# Patient Record
Sex: MALE | Race: BLACK OR AFRICAN AMERICAN | Employment: OTHER | URBAN - METROPOLITAN AREA
[De-identification: names, ages, dates, MRNs, and addresses within clinical notes are randomized per-mention and may not be internally consistent; named-entity substitution may affect disease eponyms.]

---

## 2019-01-12 ENCOUNTER — IP HISTORICAL/CONVERTED ENCOUNTER (OUTPATIENT)
Dept: OTHER | Age: 29
End: 2019-01-12

## 2021-10-20 ENCOUNTER — HOSPITAL ENCOUNTER (INPATIENT)
Age: 31
LOS: 6 days | Discharge: HOME | DRG: 898 | End: 2021-10-26
Attending: PSYCHIATRY & NEUROLOGY | Admitting: PSYCHIATRY & NEUROLOGY
Payer: OTHER GOVERNMENT

## 2021-10-20 DIAGNOSIS — F10.10 ETOH ABUSE: ICD-10-CM

## 2021-10-20 DIAGNOSIS — F22 DELUSIONAL DISORDER (HCC): Primary | ICD-10-CM

## 2021-10-20 LAB
ALBUMIN SERPL-MCNC: 4.5 G/DL (ref 3.5–5)
ALBUMIN/GLOB SERPL: 1.2 {RATIO} (ref 1.1–2.2)
ALP SERPL-CCNC: 80 U/L (ref 45–117)
ALT SERPL-CCNC: 28 U/L (ref 12–78)
AMPHET UR QL SCN: NEGATIVE
ANION GAP SERPL CALC-SCNC: 10 MMOL/L (ref 5–15)
APAP SERPL-MCNC: <10 UG/ML (ref 10–30)
APPEARANCE UR: CLEAR
AST SERPL W P-5'-P-CCNC: 15 U/L (ref 15–37)
BACTERIA URNS QL MICRO: NEGATIVE /HPF
BARBITURATES UR QL SCN: NEGATIVE
BENZODIAZ UR QL: NEGATIVE
BILIRUB SERPL-MCNC: 0.6 MG/DL (ref 0.2–1)
BILIRUB UR QL: NEGATIVE
BUN SERPL-MCNC: 7 MG/DL (ref 6–20)
BUN/CREAT SERPL: 7 (ref 12–20)
CA-I BLD-MCNC: 8.9 MG/DL (ref 8.5–10.1)
CANNABINOIDS UR QL SCN: POSITIVE
CHLORIDE SERPL-SCNC: 107 MMOL/L (ref 97–108)
CO2 SERPL-SCNC: 26 MMOL/L (ref 21–32)
COCAINE UR QL SCN: NEGATIVE
COLOR UR: NORMAL
CREAT SERPL-MCNC: 1.02 MG/DL (ref 0.7–1.3)
DATE LAST DOSE: ABNORMAL
DATE LAST DOSE: ABNORMAL
DRUG SCRN COMMENT,DRGCM: ABNORMAL
ERYTHROCYTE [DISTWIDTH] IN BLOOD BY AUTOMATED COUNT: 13.7 % (ref 11.5–14.5)
ETHANOL SERPL-MCNC: 188 MG/DL
ETHANOL SERPL-MCNC: 296 MG/DL
GLOBULIN SER CALC-MCNC: 3.7 G/DL (ref 2–4)
GLUCOSE SERPL-MCNC: 91 MG/DL (ref 65–100)
GLUCOSE UR STRIP.AUTO-MCNC: NEGATIVE MG/DL
HCT VFR BLD AUTO: 44.9 % (ref 36.6–50.3)
HGB BLD-MCNC: 15.2 G/DL (ref 12.1–17)
HGB UR QL STRIP: NEGATIVE
KETONES UR QL STRIP.AUTO: NEGATIVE MG/DL
LEUKOCYTE ESTERASE UR QL STRIP.AUTO: NEGATIVE
MCH RBC QN AUTO: 29.3 PG (ref 26–34)
MCHC RBC AUTO-ENTMCNC: 33.9 G/DL (ref 30–36.5)
MCV RBC AUTO: 86.7 FL (ref 80–99)
METHADONE UR QL: NEGATIVE
NITRITE UR QL STRIP.AUTO: NEGATIVE
NRBC # BLD: 0 K/UL (ref 0–0.01)
NRBC BLD-RTO: 0 PER 100 WBC
OPIATES UR QL: NEGATIVE
PCP UR QL: NEGATIVE
PH UR STRIP: 7 [PH] (ref 5–8)
PLATELET # BLD AUTO: 299 K/UL (ref 150–400)
PMV BLD AUTO: 10.7 FL (ref 8.9–12.9)
POTASSIUM SERPL-SCNC: 3.9 MMOL/L (ref 3.5–5.1)
PROT SERPL-MCNC: 8.2 G/DL (ref 6.4–8.2)
PROT UR STRIP-MCNC: NEGATIVE MG/DL
RBC # BLD AUTO: 5.18 M/UL (ref 4.1–5.7)
RBC #/AREA URNS HPF: NORMAL /HPF (ref 0–5)
REPORTED DOSE,DOSE: ABNORMAL UNITS
REPORTED DOSE,DOSE: ABNORMAL UNITS
SALICYLATES SERPL-MCNC: <1.7 MG/DL (ref 2.8–20)
SARS-COV-2, COV2: NOT DETECTED
SODIUM SERPL-SCNC: 143 MMOL/L (ref 136–145)
SP GR UR REFRACTOMETRY: <1.005 (ref 1–1.03)
UROBILINOGEN UR QL STRIP.AUTO: 0.1 EU/DL (ref 0.1–1)
WBC # BLD AUTO: 8.9 K/UL (ref 4.1–11.1)
WBC URNS QL MICRO: NORMAL /HPF (ref 0–4)

## 2021-10-20 PROCEDURE — 65220000003 HC RM SEMIPRIVATE PSYCH

## 2021-10-20 PROCEDURE — 80307 DRUG TEST PRSMV CHEM ANLYZR: CPT

## 2021-10-20 PROCEDURE — 87635 SARS-COV-2 COVID-19 AMP PRB: CPT

## 2021-10-20 PROCEDURE — 74011250636 HC RX REV CODE- 250/636: Performed by: NURSE PRACTITIONER

## 2021-10-20 PROCEDURE — 80143 DRUG ASSAY ACETAMINOPHEN: CPT

## 2021-10-20 PROCEDURE — 99283 EMERGENCY DEPT VISIT LOW MDM: CPT

## 2021-10-20 PROCEDURE — 36415 COLL VENOUS BLD VENIPUNCTURE: CPT

## 2021-10-20 PROCEDURE — 85027 COMPLETE CBC AUTOMATED: CPT

## 2021-10-20 PROCEDURE — 80053 COMPREHEN METABOLIC PANEL: CPT

## 2021-10-20 PROCEDURE — 82077 ASSAY SPEC XCP UR&BREATH IA: CPT

## 2021-10-20 PROCEDURE — 81001 URINALYSIS AUTO W/SCOPE: CPT

## 2021-10-20 PROCEDURE — 96372 THER/PROPH/DIAG INJ SC/IM: CPT

## 2021-10-20 PROCEDURE — 80179 DRUG ASSAY SALICYLATE: CPT

## 2021-10-20 RX ORDER — LORAZEPAM 2 MG/ML
1 INJECTION INTRAMUSCULAR
Status: DISCONTINUED | OUTPATIENT
Start: 2021-10-20 | End: 2021-10-26 | Stop reason: HOSPADM

## 2021-10-20 RX ORDER — ADHESIVE BANDAGE
30 BANDAGE TOPICAL DAILY PRN
Status: DISCONTINUED | OUTPATIENT
Start: 2021-10-20 | End: 2021-10-26 | Stop reason: HOSPADM

## 2021-10-20 RX ORDER — HALOPERIDOL 5 MG/ML
5 INJECTION INTRAMUSCULAR
Status: DISCONTINUED | OUTPATIENT
Start: 2021-10-20 | End: 2021-10-26 | Stop reason: HOSPADM

## 2021-10-20 RX ORDER — DIPHENHYDRAMINE HYDROCHLORIDE 50 MG/ML
50 INJECTION, SOLUTION INTRAMUSCULAR; INTRAVENOUS
Status: DISCONTINUED | OUTPATIENT
Start: 2021-10-20 | End: 2021-10-26 | Stop reason: HOSPADM

## 2021-10-20 RX ORDER — ZIPRASIDONE MESYLATE 20 MG/ML
20 INJECTION, POWDER, LYOPHILIZED, FOR SOLUTION INTRAMUSCULAR
Status: DISCONTINUED | OUTPATIENT
Start: 2021-10-20 | End: 2021-10-26 | Stop reason: HOSPADM

## 2021-10-20 RX ORDER — ACETAMINOPHEN 325 MG/1
650 TABLET ORAL
Status: DISCONTINUED | OUTPATIENT
Start: 2021-10-20 | End: 2021-10-26 | Stop reason: HOSPADM

## 2021-10-20 RX ORDER — ZIPRASIDONE MESYLATE 20 MG/ML
20 INJECTION, POWDER, LYOPHILIZED, FOR SOLUTION INTRAMUSCULAR
Status: COMPLETED | OUTPATIENT
Start: 2021-10-20 | End: 2021-10-20

## 2021-10-20 RX ORDER — HYDROXYZINE 50 MG/1
50 TABLET, FILM COATED ORAL
Status: DISCONTINUED | OUTPATIENT
Start: 2021-10-20 | End: 2021-10-26 | Stop reason: HOSPADM

## 2021-10-20 RX ORDER — TRAZODONE HYDROCHLORIDE 50 MG/1
50 TABLET ORAL
Status: DISCONTINUED | OUTPATIENT
Start: 2021-10-20 | End: 2021-10-26 | Stop reason: HOSPADM

## 2021-10-20 RX ADMIN — ZIPRASIDONE MESYLATE 20 MG: 20 INJECTION, POWDER, LYOPHILIZED, FOR SOLUTION INTRAMUSCULAR at 17:21

## 2021-10-20 NOTE — ED NOTES
Received care of patient, cooperative but agitated, states he does not want to be here, wants to leave. No si or hi at this time, no visual or auditory hallucination. Placed in green gown, room psych proofed, belongings placed in station three patient belongings, cco at bedside,  notified.

## 2021-10-20 NOTE — ED TRIAGE NOTES
Kellie Manual referred him here. .. Sgt. .states that ETOH is in his system [facility] closing. Here for evaluation because staets he's going through a lot. Pt states he has the right to drink if he chooses. . Denies SI or HI.

## 2021-10-20 NOTE — ED NOTES
Patient remains agitated at this time, states he is ready to leave. Wants to talk to d19 right now or he is gonna walk out, he doesn't care if police have to come and get him. cco at bed side.

## 2021-10-20 NOTE — BSMART NOTE
Dr Katherin Pearce will accept pt to 2S (Room 239/1), however he wants pts ETOH level to be </= 150.   Evelia and Idris Garcia made aware

## 2021-10-20 NOTE — ED NOTES
Constant Observer No   Constant Observer Oriented NO   High risk patients are in line of sight at all times No    Excess equipment/medical supplies not necessary for the care of the patient removed    All sharp or dangerous objects are removed from room: including but not limited to belts, pens & pencils, needles, medications, cosmetics, lighters, matches, nail files, watches, necklaces, glass objects, razors, razor blades, knives, aerosol sprays, drawstring pants, shoes, cords (telephone, call bells, etc.) cleaning wipes or other cleaning items, aluminum cans, not permanently attached wall décor No -    Telephone/cell phone removed as well as TV remote (batteries can be swallowed) No -   Patient belongings removed and labeled at nurses station No -    Excess linen is removed from room No -    All plastic bags are removed from the room and replaced with paper trash bags No -    Patient is in paper scrubs or appropriate gown and using hospital socks with rubber soles NO   No metal, hard eating utensils or hard plates are on meal tray No -   Remove all cleaning agents used by [employer] No -   If Crucifix is hanging on a nail, remove Crucifix as well as the nail No -    Patient belongings in station three pscy secure, arm outfit, boots.
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
## 2021-10-20 NOTE — ED NOTES
Code valentín called, patient became agitated, walked out in hallway, nurse unable to talk patient down, states he doesn't want to be here anymore he has two kids he needs to go home and take care of. Medication order,  at bedside, nurse gave patient IM medication without any further conflict. cco at bedside.

## 2021-10-20 NOTE — Clinical Note
Status[de-identified] IP BEHAVIORAL MEDICINE [112]  Type of Bed: Behavioral Health Acute [27]  Inpatient Hospitalization Certified Necessary for the Following Reasons: 7. Inpatient psychiatric hospital admission is medically necessary for treatment which can reas onably be expected to improve the patient's condition and/or for diagnostic study.   Admitting Diagnosis: ETOH abuse [488920]  Admitting Diagnosis: Delusion [facility] [[id number]]  Admitting Physician: Delia Ramires [[id number]]  Attending Physician: Delia Ramires [[id number]]  Estimated Length of Stay: 5-7 Midnights  Discharge Plan[de-identified] Home with Office Follow-up

## 2021-10-21 LAB — ETHANOL SERPL-MCNC: 144 MG/DL

## 2021-10-21 PROCEDURE — 65220000003 HC RM SEMIPRIVATE PSYCH

## 2021-10-21 RX ORDER — ASPIRIN 325 MG/1
100 TABLET, FILM COATED ORAL DAILY
Status: DISCONTINUED | OUTPATIENT
Start: 2021-10-22 | End: 2021-10-26 | Stop reason: HOSPADM

## 2021-10-21 NOTE — GROUP NOTE
[facility] GROUP DOCUMENTATION INDIVIDUAL                                                                          Group Therapy Note    Date: 10/21/2021    Group Start Time: 2804  Group End Time: 1600  Group Topic: Reflection/Relaxation    [facility] CARE MANAGEMENT    Jessie Aldana    IP [facility] GROUP DOCUMENTATION GROUP    Group Therapy Note  Patients were encouraged to participate in a guided meditation. Pts were asked to talk about their past encounters with meditation and relaxation. Pts focused on feelings and skills involved in guided meditation and the group discussed appropriate applications for meditation and guided meditation. Attendees: 6/13         Attendance: Attended    Patient's Goal:  Pt responded to check in question by stating that he is feeling \"good. \"     Interventions/techniques: Informed, Promoted peer support and Provide feedback    Follows Directions: Followed directions    Interactions: Interacted appropriately    Mental Status: Anxious, Congruent and Preoccupied    Behavior/appearance: Agitated and Attentive    Goals Achieved: Able to self-disclose and Identified feelings      Additional Notes:  Pt presented with an anxious affect and congruent mood. Pt appeared preoccupied but was willing to participate and try the guided meditation. Pt was polite and respectful.      Yamini Georges

## 2021-10-21 NOTE — GROUP NOTE
IP  GROUP DOCUMENTATION INDIVIDUAL                                                                          Group Therapy Note    Date: 10/21/2021    Group Start Time: 1110  Group End Time: 0446    Group Topic: Education Group - Inpatient    [facility] 2 BH NON ACUTE    Aissatou Bhatt    [facility] GROUP DOCUMENTATION GROUP    Group Therapy Note    Facilitated group session focused on introducing information on developing a thought record to help challenge negative thoughts and develop a more accurate view of a situation      Attendees: 6/10         Attendance: Attended    Patient's Goal:  Attend group daily     Interventions/techniques: Informed and Supported    Follows Directions: Followed directions    Interactions: Interacted appropriately    Mental Status: Calm    Behavior/appearance: Cooperative    Goals Achieved: Able to engage in interactions, Able to listen to others and Able to self-disclose      Additional Notes:  Receptive to information discussed on developing a thought record and was able to recognize examples of different negative thoughts. Pt was able to share a personal example when he was thinking negative about a stressful event  and was able to challenge it.  Pt shared \"this time he was just drinking and was not suicidal and don't need to be here\"     Ervin Waldron, [address]

## 2021-10-21 NOTE — BH NOTES
Dx: Depression & SA (UDS + THC; ETOH)    Affect full. Pt is here, under a TDO, and is scheduled to have his commitment hearing tomorrow. Currently not on any scheduled meds; no request for prn's. 0800 T97.6 P74 R18 Consumed 100% of bfkt. Showered and changed his clothes. Said he would like to be transferred to the front unit. Encouraged to attend groups. Q 15 mins checks maintained, for safety.

## 2021-10-21 NOTE — GROUP NOTE
[facility]  GROUP DOCUMENTATION INDIVIDUAL                                                                          Group Therapy Note    Date: 10/21/2021    Group Start Time: 5815  Group End Time: 1500  Group Topic: AA/NA    [facility] 2 BEHA HLTH ACUTE    Josselyn MEADE  GROUP DOCUMENTATION GROUP    Group Therapy Note  The therapist facilitated a group by allowing the patient to speak about their own experiences with substances. Different characteristics of addiction were also addressed. Attendees: 6         Attendance: Attended    Patient's Goal:  To attend groups and activities     Interventions/techniques: Supported    Follows Directions: Followed directions    Interactions: Interacted appropriately    Mental Status: Calm and Flat    Behavior/appearance: Attentive    Goals Achieved: Able to engage in interactions and Able to listen to others      Additional Notes: The patient was present for the majority of group and appeared to be listening to his peers but he did not share anything.      Hector Knee
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
## 2021-10-21 NOTE — GROUP NOTE
IP  GROUP DOCUMENTATION INDIVIDUAL                                                                          Group Therapy Note    Date: 10/21/2021    Group Start Time: 5987  Group End Time: 1400  Group Topic: Process Group - Inpatient    SRM CARE MANAGEMENT    Dilia Carlosmaricruz    IP [address] [facility] GROUP DOCUMENTATION GROUP    Group Therapy Note    Attendees: 5/16    Patients were asked how they were feeling as a check-in question. The writer then facilitated an open discussion about anger and the importance of learning how to manage it. The writer then encouraged pt's to participate in a n activity that consisted of rolling a ball to one another that had different probing questions related to anger management and the utilization of coping skills and self-awareness         Attendance: Attended    Patient's Goal:  Pt responded to the check-in that he was feeling frustrated    Interventions/techniques: Informed and Validated    Follows Directions: Followed directions    Interactions: Interacted appropriately    Mental Status: Anxious, Congruent and Flat    Behavior/appearance: Attentive and Cooperative    Goals Achieved: Able to engage in interactions, Able to listen to others, Able to give feedback to another, Able to reflect/comment on own behavior, Able to manage/cope with feelings and Able to receive feedback      Additional Notes:  Pt was attentive during group. Pt was able to self-disclose that when he gets angry he wants to be alone.  The pt was able to identify that his child and dog give him emotional support and make him feel better when he is angry    Kemal Klein

## 2021-10-21 NOTE — BH NOTES
Admission Note:    Patient is a 36year old AA male brought to [facility] under TDO status due to alcohol abuse and depression. Denies suicidal or homicidal ideation. Denies auditory or visual hallucinations. Reports smoking 5-10 cigarettes per day since age 14; drinks alcohol twice a month. Last drink was 10/20/21. Patient denies recreational drug use; however, patient UDS positive for THC. Denies medical history. Reports psych history of inpatient admission. Patient alert and oriented to person, place, and time; lacks insight into situation. Mood depressed/calm/cooperative; affect flat. Thought process unremarkable; thought content blaming others. Speech rregular rate and rhythm. Safety search completed.

## 2021-10-21 NOTE — ED NOTES
.. TRANSFER - OUT REPORT:    Verbal report given to JOSE Suresh on Alfred Talley  being transferred to [address] [place] [address] for routine progression of care       Report consisted of patients Situation, Background, Assessment and   Recommendations(SBAR). Information from the following report(s) SBAR, ED Summary and MAR was reviewed with the receiving nurse. Lines:       Opportunity for questions and clarification was provided.       Patient transported with:   [facility], [facility] [address]

## 2021-10-21 NOTE — ED NOTES
Pt awaiting TDO to be moved upstairs, ETOH will be redrawn.  Gavi from [facility] made aware

## 2021-10-21 NOTE — BH NOTES
PSA PART II ADDITIONAL INFORMATION        Access To Fire Arms: No    Substance Use: YES    Last Use: 10/20/221    Type of Substance: Alcohol use    Frequency of Use: Pt claims twice a month     Request to See : NO    If yes, notified : NO    Release of Information Signed: YES    Release of Information Signed For:     Maci Aguilar (mother) [phone]

## 2021-10-21 NOTE — BH NOTES
Writer spoke with pts mother, Riya Booth, ([phone]). she reports that the pt 'does not need to be on a psych hunter, you are making it worse for him'. Writer validated her feelings and explained pt reasons for admission. Tosin said that the pt should have been able to leave and writer said that at the time of admission, the pt did not have capacity to make safe decisions for himself. Tosin said that she and the pt have discussed his drinking and he 'knows' that he needs treatment.  Tosin reports that she would like to participate in the TDO hearing tomorrow as she feels it would 'be a mistake' for the pt to be committed

## 2021-10-22 PROCEDURE — 65220000003 HC RM SEMIPRIVATE PSYCH

## 2021-10-22 NOTE — GROUP NOTE
IP  GROUP DOCUMENTATION INDIVIDUAL                                                                          Group Therapy Note    Date: 10/22/2021    Group Start Time: 1125  Group End Time: 1200  Group Topic: Education Group - Inpatient    SRM 2  NON ACUTE    Rachell Frank    IP [address] [facility] GROUP DOCUMENTATION GROUP    Group Therapy Note    Healthy relationships/Facilitated discussion focused on breaking down our walls and  being able to recognize attitudes and behaviors that may get in the way of forming or maintaining  quality relationships    Attendees: 5/14         Attendance: Attended    Patient's Goal:  Attend group daily     Interventions/techniques: Informed and Supported    Follows Directions: Followed directions    Interactions: Interacted appropriately    Mental Status: Calm    Behavior/appearance: Cooperative    Goals Achieved: Able to engage in interactions and Able to listen to others      Additional Notes:     Receptive to information discussed and engaged. Pt shared \"don't let anything\" get in the way of forming or maintaining a quality relationship. Pt shared \"he hope he is able to go home today\"    Chris Alcazar, [address]

## 2021-10-22 NOTE — BH NOTES
Patient calm,cooperative and interactive, when approached. Denies SI,HI and AVH's. States he has a \"love-hate relationship\" with the [employer]. 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 Currently on no medications. Mood-content. Affect-flat. Shows no s/sx of any ETOH withdrawal. Appetite-fair. Tolerates po well. Denies n/v/d and or c/f/s.

## 2021-10-22 NOTE — GROUP NOTE
IP  GROUP DOCUMENTATION INDIVIDUAL                                                                          Group Therapy Note    Date: 10/22/2021    Group Start Time: 8339  Group End Time: 2992  Group Topic: Recreational/Music Therapy    SRM 2  NON ACUTE    Matthieu Acevedo    IP [address] [facility] GROUP DOCUMENTATION GROUP    Group Therapy Note    Facilitated leisure skills group to reinforce positive coping through music, social interaction, group activities and arts/crafts    Attendees: 5/14         Attendance: Attended    Patient's Goal:  Attend group daily     Interventions/techniques: Art integration and Supported    Follows Directions: Followed directions    Interactions: Interacted appropriately    Mental Status: Calm    Behavior/appearance: Cooperative    Goals Achieved: Able to engage in interactions and Able to listen to others      Additional Notes:  Receptive to listening to music while working on leisure task. Interacted with peers and staff.     AURELIO Ortega

## 2021-10-22 NOTE — PROGRESS NOTES
Problem: Depressed Mood (Adult/Pediatric)  Goal: *STG: Attends activities and groups  Outcome: Progressing Towards Goal     Problem: Depressed Mood (Adult/Pediatric)  Goal: *STG: Demonstrates reduction in symptoms and increase in insight into coping skills/future focused  Outcome: Progressing Towards Goal     Problem: Depressed Mood (Adult/Pediatric)  Goal: *LTG: Understands illness and can identify signs of relapse  Outcome: Progressing Towards Goal

## 2021-10-22 NOTE — H&P
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 CHIEF COMPLAINT:  \"Simply drinking and driving. \"    HISTORY OF PRESENT ILLNESS:  The patient says he is in the [employer] for 3-1/2 years, he has a 4-year contract. He does work as power generator machine . He says he was at a job and he walked into a store, he wanted to drink, got 6 shots of vodka. Apparently, he already had been going through Chapter 9 processing because of alcohol abuse issues, behavioral health issues. He was seen by [facility] and also [facility]. When he came in, he did not want to stay. He was irritable, angry, pacing, wanted leave and threatening that even every  in [place] cannot stop him, and D19 was called. They got collateral information from the platoon sergeant and also from his child's mother, who apparently was scared of him and trying to get a restraining order. Initially, he was sent here and when I saw him, he is polite, calm and he wanted to leave but also asking the TDO process, excellent, independent, evaluator ,  and offering voluntary admission versus TDO, and he had a choice to seek one. The patient claims he only drinks twice a month. He has gone thorough substance abuse program.  He is seeing a therapist at [place] but does not know the name of any medications, not taking. He says his appetite is okay. He sleeps 68 hours, he thinks he might be sleeping excessive. Energy, motivation okay. Denies suicidal thoughts. TRAUMA HISTORY:  Denied. FAMILY HISTORY:  Denied.     SOCIAL HISTORY:  Smokes a half a pack of cigarettes a day, does not want any patch. He does use alcohol. His blood alcohol was 298. He is from [facility]. He is in the [employer] for 3-1/2 years, another 6 months to go. He says he has a 3-3/1year-old child. His girlfriend is pregnant. He does acknowledge anxiety. PAST HISTORY:  He had    1-1/2 to 2 years ago. Currentl. ALLERGIES:  NO KNOWN MEDICATION ALLERGIES. CURRENT MEDICATIONS:  Unknown. MENTAL STATUS EXAM:  Average height, thin-built but fairly nourished male patient, long hair and a little bit disheveled. Alert, polite, cooperative. No psychosis at this time. Not suicidal, not homicidal.  No tremulousness. He claims he only drinks couple of times a month and that he drank 6 shots yesterday. Memory recall is fair. IQ about average. No tremulousness. No diaphoresis. Normal strength and coordination. Insight, limited. Judgment adequate. DIAGNOSES:  Alcohol intoxication and Alcohol abuse. DISPOSITION:  The patient needs an inpatient level of care, close observation, and medical consult with individual therapy and group therapy. Place him on thiamine, p.r.n. Ativan, Prolixin, and detox. We will also add Topamax 25 mg a day. LENGTH OF STAY:  3-7 days. CRITERIA FOR DISCHARGE:  Safe and smooth detoxification. Not exhibit any self-destructive behavior. Not suicidal, not homicidal.  No psychosis. Stable neurovegetative function. Understands the impact of substance abuse at work, relationship, behaviors and legal context. Continued inpatient level of care indicated. Follow up in [facility].       MD ABDELRAHMAN Kelly/[email or web address]  D:  10/21/2021 23:58  T:  10/22/2021 5:07  JOB #:  [id number]

## 2021-10-22 NOTE — BH NOTES
Writer spoke with pts mother, Giuliano Spring, and girlfriend, Carl Monique. Writer informed them both of TDO process and Shelby requested to be part of the hearing. Writer provided [employer] with the phone number for her to be contacted during hearing.  Both parties voiced that they feel the pt does not need to be in the hospital. Writer validated their feelings

## 2021-10-22 NOTE — BH NOTES
Behavioral Health Treatment Team Note     Patient goal(s) for today: to get released today and see my son  Treatment team focus/goals: to continue medication management and group therapy    Progress note: pt presented with a calm affect and congruent mood. Pt denied SI/HI and AHV's. Pt shared that he is anxious about his hearing today. Pt shared that he feels he should not be here and that he will not be able to do well here if he stays. Pt stated it would be a \"waste of everyone's time\". The pt shared he feels he has a great support system and accepts responsibility for the incident that occurred with him drinking and the situation \"getting out of hand\". The pt was respectful and polite with the writer while maintaining eye contact. The pt also shared that his mother and girlfriend have been trying to contact his  here before the hearing, leaving voicemails of support for the pt. The pt asked the writer if they felt he would be released today, the writer responded that they were not sure because that is not something they are involved with. The pt was receptive and understanding and thanked the writer for their time. An inpatient level of care is still necessary for symptom management. LOS:  2  Expected LOS: hearing today    Insurance info/prescription coverage:    Date of last family contact:  10/21/21  Family requesting physician contact today:  no  Discharge plan:  Therapist is exploring options  Guns in the home:  no   Outpatient provider(s):   Will be coordinated prior to d/c    Participating treatment team members: Connor Knutson, * (assigned SW), KIM Plasencia intern

## 2021-10-22 NOTE — BH NOTES
TDO DISPO    Pt committed for up to 10 days per Indigo Mahoney on 10/22/21, represented by Mr. Adonay Pereira. Paperwork placed on pt's chart.

## 2021-10-22 NOTE — GROUP NOTE
DESHAUN  GROUP DOCUMENTATION INDIVIDUAL                                                                          Group Therapy Note    Date: 10/22/2021    Group Start Time: 0340  Group End Time: 1400  Group Topic: Process Group - Inpatient    SRM CARE MANAGEMENT    Donis Pruitt    IP [facility] GROUP DOCUMENTATION GROUP    Group Therapy Note  Patients were encouraged to self-disclose about things that are weighing heavily on them today. Pts were encouraged to provide peer support and self-disclose. Pts were asked to identify triggers and actions and how to better cope with stressful situations. Attendees: 4/14         Attendance: Attended    Patient's Goal:  Pt responded to check in question and stated that he is feeling \"frustrated. \"     Interventions/techniques: Informed, Validated, Promoted peer support and Supported    Follows Directions: Followed directions    Interactions: Interacted appropriately    Mental Status: Anxious, Congruent and Preoccupied    Behavior/appearance: Attentive, Cooperative and Motivated    Goals Achieved: Able to engage in interactions, Able to listen to others, Able to self-disclose, Discussed coping, Discussed discharge plans, Identified feelings and Identified triggers      Additional Notes:  Pt presented with an anxious affect and congruent  Mood. At times pt presented with elevated and pressured speech due to feeling frustrated. Pt was able to self disclose and open up to the group about how his actions brought him to the hospital. Pt was polite and respectful. Pt accepted feedback from his peers.      Gina Tristan

## 2021-10-22 NOTE — BH NOTES
Writer spoke with pt. He reported that he is upset about having to stay but has 'accepted it'. Writer asked pt why he drinks and he reported that he 'doesn't know why'. Writer asked how much he drinks and he said 'only a few times a month but a lot'. Writer asked if he identifies as a binge drinking and the pt said that he does. Writer spoke about inpatient treatment which he declined but said he would be open to [facility] or PHP. 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 Writer encouraged pt to think about how drinking led to these consequences and what he needs or should do so that he can be happy and healthier moving forward.

## 2021-10-22 NOTE — GROUP NOTE
DESHAUN  GROUP DOCUMENTATION INDIVIDUAL                                                                          Group Therapy Note    Date: 10/22/2021    Group Start Time: 0930  Group End Time: 1000  Group Topic: [employer]    SRM 2 BEHA [facility], RN    IP 1150 [facility] GROUP DOCUMENTATION GROUP    Group Therapy Note  Community Meeting/Nursing Education          Attendance: Attended    Patient's Goal:      Interventions/techniques: Validated    Follows Directions:  Followed directions    Interactions: Interacted appropriately    Mental Status: Calm    Behavior/appearance: Cooperative    Goals Achieved: Able to engage in interactions and Able to receive feedback      Additional Notes:      Shakeel Ge RN

## 2021-10-22 NOTE — BH NOTES
Alert and oriented x4. Affect and mood remains flat and depressed. Denies SI/HI, no AVH reported. Continues to be depressed and wants to be discharged. Pt lacks insight and has poor judgement related to his ETOH and marijuana usage. Denies addiction and need for treatment. Meal compliant. Atendeing groups. Requests to be moved to front unit asap. Went before , agreed to stay voluntarily, but because he was not truthful, he was committed for up to 10 days. Pt was visibly upset by this, but is managing to remain calm. Remains on close observation for safety, Continue to monitor.

## 2021-10-23 PROCEDURE — 74011250637 HC RX REV CODE- 250/637: Performed by: PSYCHIATRY & NEUROLOGY

## 2021-10-23 PROCEDURE — 65220000003 HC RM SEMIPRIVATE PSYCH

## 2021-10-23 RX ADMIN — THIAMINE HCL TAB 100 MG 100 MG: 100 TAB at 10:35

## 2021-10-23 NOTE — PROGRESS NOTES
Progress Note  Date: 10/22/2021       Room:[facility]  Patient Name:Brennon Purvis     YOB: 1990     Age:31 y.o. 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 Patient has been struggling with alcohol dependence and reported his child's mother have been scared of him and trying to greater restraint order. It is reported to have been difficult to be redirected. It is noted that he is has a hearing this morning for TDO. He has been given the options to volunteer for treatment. Patient this morning reports feeling okay still presents labile and impulsive. Continues to have impaired insight into his impact of alcohol into his mood and safety. Mental status examination-patient is alert, oriented to name place person, presents anxious labile with impaired insight as described above denies any active plan suicide or homicide. No evidence of any active psychosis noted. Presents with limited judgment. Review of Systems  Objective         Vitals Last 24 Hours:  TEMPERATURE:  Temp  Av.1 °F (36.7 °C)  Min: 97.8 °F (36.6 °C)  Max: 98.7 °F (37.1 °C)  RESPIRATIONS RANGE: Resp  Av.3  Min: 16  Max: 20  PULSE OXIMETRY RANGE: SpO2  Av %  Min: 98 %  Max: 98 %  PULSE RANGE: Pulse  Av.7  Min: 57  Max: 67  BLOOD PRESSURE RANGE: Systolic (09WGZ), WCW:610 , Min:119 , EHP:148   ; Diastolic (54EJO), KFI:03, Min:80, Max:89    I/O (24Hr): No intake or output data in the 24 hours ending 10/23/21 1244  Objective  Labs/Imaging/Diagnostics    Labs:  CBC:Recent Labs     10/20/21  1451   WBC 8.9   RBC 5.18   HGB 15.2   HCT 44.9   MCV 86.7   RDW 13.7        CHEMISTRIES:  Recent Labs     10/20/21  1451      K 3.9      CO2 26   BUN 7   CA 8.9   PT/INR:No results for input(s): INR, INREXT in the last 72 hours. No lab exists for component: PROTIME  APTT:No results for input(s):  APTT in the last 72 hours.  LIVER PROFILE:  Recent Labs     10/20/21  1451   AST 15   ALT 28     Lab Results   Component Value Date/Time    ALT (SGPT) 28 10/20/2021 02:51 PM    AST (SGOT) 15 10/20/2021 02:51 PM    Alk. phosphatase 80 10/20/2021 02:51 PM    Bilirubin, total 0.6 10/20/2021 02:51 PM       Imaging Last 24 Hours:  No results found.   Assessment//Plan   Active Problems:    Delusion ([address] (10/20/2021)      ETOH abuse (10/20/2021)      Assessment & Plan  Discussed his mood and stressors  Discussed that he has a TDO hearing this morning  No new concerns voiced  Continue group therapy  No SI HI voiced today  Still presents with labile mood and impulsivity and impaired insight      Current Facility-Administered Medications:     thiamine mononitrate (B-1) tablet 100 mg, 100 mg, Oral, DAILY, Trace Fung MD, 100 mg at 10/23/21 1035    haloperidol lactate (HALDOL) injection 5 mg, 5 mg, IntraMUSCular, Q6H PRN, Trace Fung MD    diphenhydrAMINE (BENADRYL) injection 50 mg, 50 mg, IntraMUSCular, BID PRN, Melissa Fung MD    hydrOXYzine HCL (ATARAX) tablet 50 mg, 50 mg, Oral, TID PRN, Melissa Fung MD    LORazepam (ATIVAN) injection 1 mg, 1 mg, IntraMUSCular, Q4H PRN, Ketty Hernandez MD    traZODone (DESYREL) tablet 50 mg, 50 mg, Oral, QHS PRN, Ketty Hernandez MD    acetaminophen (TYLENOL) tablet 650 mg, 650 mg, Oral, Q4H PRN, Ketty Hernandez MD    magnesium hydroxide (MILK OF MAGNESIA) 400 mg/5 mL oral suspension 30 mL, 30 mL, Oral, DAILY PRN, Trace Fung MD    ziprasidone (GEODON) injection 20 mg, 20 mg, IntraMUSCular, Q12H PRN, Ketty Hernandez MD  Electronically signed by Viktoria Rubin MD on 10/23/2021 at 12:44 PM

## 2021-10-23 NOTE — PROGRESS NOTES
Problem: Depressed Mood (Adult/Pediatric)  Goal: *STG: Participates in treatment plan  Outcome: Progressing Towards Goal  Goal: *STG: Attends activities and groups  Outcome: Progressing Towards Goal  Pt attended select groups with active participation. Pt denied thoughts of self harm nor any physical concerns. Pt educated on meds, he accepted as ordered. Pt encouraged to seek staff as needed.

## 2021-10-23 NOTE — BH NOTES
Patient visible walking in hallway at beginning shift; went to room to lay down. Denies suicidal ideation or thoughts of self harm. Denies homicidal ideation. Denies hallucinations. Denies anxiety or depression. No HS medication. Will continue to monitor for safety.

## 2021-10-23 NOTE — BH NOTES
Patient transferred to front to room 244. Patient belongings that were in room with patient brought to front.

## 2021-10-23 NOTE — BH NOTES
Behavioral Health Treatment Team Note     Patient goal(s) for today: \"go to all the groups, talk to girlfriend, and learn more about treatment plan\"  Treatment team focus/goals: Attend group and continue medication treatment     Progress note: The pt described his mood as stressed and presented a calm mood with a flat affect. He denied SI/HI/AVH at this time x4 and asked the writer if his discharge plan can be discussed. The writer reminded him that he had committed to stay per his hearing that he had but still wanted to speak with his  about what the plans are for discharge. The pt was cooperative with the writer and maintained appropriate eye contact with the writer. An inpatient level of care is still necessary for symptom management and a safe discharge plan. LOS:  3  Expected LOS: 5-7 Days     Insurance info/prescription coverage:    Date of last family contact:  10/21/21  Family requesting physician contact today:  no  Discharge plan:  Therapist is exploring options  Guns in the home:  no   Outpatient provider(s):   Will be coordinated prior to d/c    Participating treatment team members: Se Mario, * (assigned SW), Kaylin Carrera

## 2021-10-23 NOTE — GROUP NOTE
IP  GROUP DOCUMENTATION INDIVIDUAL                                                                          Group Therapy Note    Date: 10/23/2021    Group Start Time: 1310  Group End Time: 1421  Group Topic: Reflection/Relaxation    SRM 2 BH NON ACUTE    Junius Papa    IP  GROUP DOCUMENTATION GROUP    Group Therapy Note    Facilitated group to introduce relaxation technique as a positive way to cope and manage anxiety and stress    Attendees: 9/14         Attendance: Attended    Patient's Goal:  Attend group daily     Interventions/techniques: Other /Relaxation     Follows Directions: Followed directions    Interactions: Interacted appropriately    Mental Status: Calm    Behavior/appearance: Cooperative    Goals Achieved: Able to engage in interactions and Able to listen to others      Additional Notes:  Receptive to relaxation technique. Demonstrated the ability to sit quietly and focus on guided imagery and relaxing music.   Pt verbalized feeling relaxed    Mehul Malone CTRS

## 2021-10-23 NOTE — GROUP NOTE
DESHAUN  GROUP DOCUMENTATION INDIVIDUAL                                                                          Group Therapy Note    Date: 10/23/2021    Group Start Time: 0940  Group End Time: 3015  Group Topic: Education Group - Inpatient    SRM 2  NON ACUTE    Claude Newcomer    [facility] GROUP DOCUMENTATION GROUP    Group Therapy Note    Facilitated discussion focused on the definition and symptoms of anxiety and positive ways to manage symptoms    Attendees: 8/13         Attendance: Attended    Patient's Goal:  Attend group daily     Interventions/techniques: Informed and Supported    Follows Directions:  Followed directions    Interactions: Interacted appropriately    Mental Status: Calm    Behavior/appearance: Cooperative    Goals Achieved: Able to engage in interactions, Able to listen to others, Able to self-disclose and Discussed coping      Additional Notes:  Receptive to information discussed and was able to recognize different symptoms and shared a positive way to cope such as \"deep breathing exercises and working out\"    Migel Masterson, [address]

## 2021-10-23 NOTE — SUICIDE SAFETY PLAN
SAFETY PLAN    A suicide Safety Plan is a document that supports someone when they are having thoughts of suicide. Warning Signs that indicate a suicidal crisis may be developing: What (situations, thoughts, feelings, body sensations, behaviors, etc.) do you experience that lets you know you are beginning to think about suicide? 1. ***  2. ***  3. ***    Internal Coping Strategies:  What things can I do (relaxation techniques, hobbies, physical activities, etc.) to take my mind off my problems without contacting another person? 1. ***  2. ***  3. ***    People and social settings that provide distraction: Who can I call or where can I go to distract me? 1. Name: ***  Phone: ***  2. Name: ***  Phone: ***   3. Place: ***            4. Place: ***    People whom I can ask for help: Who can I call when I need help - for example, friends, family, clergy, someone else? 1. Name: ***                Phone: ***  2. Name: ***  Phone: ***  3. Name: ***  Phone: ***    Professionals or [facility] agencies I can contact during a crisis: Who can I call for help - for example, my doctor, my psychiatrist, my psychologist, a mental health provider, a suicide hotline? 1. Clinician Name: ***   Phone: ***      Clinician Pager or Emergency Contact #: ***    2. Clinician Name: ***   Phone: ***      Clinician Pager or Emergency Contact #: ***    3. Suicide Prevention Lifeline: 7-685-481-TALK (3971)    4. [address] [place] [address] Emergency Services -  for example, [address] [facility] suicide hotline, [facility] Hotline: ***      Emergency Services Address: ***      Emergency Services Phone: ***    Making the environment safe: How can I make my environment (house/apartment/living space) safer? For example, can I remove guns, medications, and other items?   1. ***  2. ***

## 2021-10-23 NOTE — PROGRESS NOTES
Patient transferred to the non acute side mid shift. Resting quietly in bed with his eyes closed. No complaints voiced at this time. Remains on q 15 minutes close observation. Continuing to monitor.

## 2021-10-24 PROCEDURE — 74011250637 HC RX REV CODE- 250/637: Performed by: PSYCHIATRY & NEUROLOGY

## 2021-10-24 PROCEDURE — 65220000003 HC RM SEMIPRIVATE PSYCH

## 2021-10-24 RX ADMIN — THIAMINE HCL TAB 100 MG 100 MG: 100 TAB at 08:27

## 2021-10-24 NOTE — BH NOTES
Patient seen for follow-up chart reviewed patient moved from the acute side to the progressive side socializing interacting with the peers staying on the telephone says he would like to go home today if not soon he says he will get a he had to spend time with his fiancée who is pregnant with his child and his son 3-3/1years old and patient says he would like to go before he has got a is again to  his the .   We will reassess the look on Monday no agitation no aggression Insight is superficial judgment poor vital signs are stable continued inpatient level of care blood pressure 119/70

## 2021-10-24 NOTE — BH NOTES
Patient continues to make progress in his recovery. Denies SI,HI and AVH's. States depression and anxiety is mild. Talks of a Treatment Program he will eventually attend for his ETOH Use D/O. On no scheduled medications. Normally does not ask for any \"prn\" meds. Tolerates diet well. Tolerates fluids well. Enjoys watching TV,socializing. and ambulating in the hallways for exercise.

## 2021-10-24 NOTE — GROUP NOTE
IP  GROUP DOCUMENTATION INDIVIDUAL                                                                          Group Therapy Note    Date: 10/24/2021    Group Start Time: 0940  Group End Time: 6264  Group Topic: Education Group - Inpatient    SRM 2  NON ACUTE    Diamond Snellen    IP [address] [facility] GROUP DOCUMENTATION GROUP    Group Therapy Note    Facilitated  group to introduce the definition and  benefits of diaphragmatic breathing and demonstration of  relaxation technique    Attendees: 11/14         Attendance: Attended    Patient's Goal:  Attend group daily     Interventions/techniques: Informed and Supported    Follows Directions:  Followed directions    Interactions: Interacted appropriately    Mental Status: Calm    Behavior/appearance: Cooperative    Goals Achieved: Able to engage in interactions and Able to listen to others      Additional Notes:  Receptive to information discussed and was able to practice diaphragmatic breathing technique    [place], CTRS

## 2021-10-24 NOTE — BH NOTES
Patient seen for follow-up chart reviewed patient is a eager to convince me patient is alert verbal oriented although 3 spheres attending all the groups he says he learned his lesson and he cannot drink alcohol if he want to keep the children he want to be responsible father who takes care of him. He says he was relatively complete his contract with the [employer] in 8 months however if there is any potential dismissal he was already prepared for other options he want to get some training. .  And put him on some Topamax 25 mg twice a day he is attending groups socializing his vital signs today temperature 98.9 pulse 75 blood pressure 129/85 respiration 19 he claims to be want to go home and take care of his pregnant wife and 3-3/1year-old son

## 2021-10-24 NOTE — PROGRESS NOTES
Problem: Depressed Mood (Adult/Pediatric)  Goal: *STG: Verbalizes anger, guilt, and other feelings in a constructive manor  Outcome: Progressing Towards Goal  Goal: *STG: Demonstrates reduction in symptoms and increase in insight into coping skills/future focused  Outcome: Progressing Towards Goal  Goal: *LTG: Returns to previous level of functioning and participates with after care plan  Outcome: Progressing Towards Goal  Goal: *LTG: Understands illness and can identify signs of relapse  Outcome: Progressing Towards Goal  Goal: Interventions  Outcome: Progressing Towards Goal     Problem: Patient Education: Go to Patient Education Activity  Goal: Patient/Family Education  Outcome: Progressing Towards Goal

## 2021-10-24 NOTE — BH NOTES
Behavioral Health Treatment Team Note     Patient goal(s) for today: \"Attend all groups and talk to girlfriend\"   Treatment team focus/goals: Attend group and continue medication management     Progress note: The pt presented sitting down in the day room doing cross word puzzles. He described his mood as good and presented a flat mood and affect. He appeared detached and preoccupied when speaking with the writer keeping his answers short. He was cooperative with the writer and denied SI/HI/AVH at this time and was oriented x4. His main focus remains d/c and going home to see his girl friend and son. An inpatient level of care is still necessary for symptom management and a safe discharge plan.         LOS:  4  Expected LOS: 5-7 Days     Insurance info/prescription coverage:    Date of last family contact:  10/24/21 w/ girl friend   Family requesting physician contact today:  no  Discharge plan:  Therapist is exploring options  Guns in the home:  no     Participating treatment team members: Danielito Wild, * (assigned SW), Kaylin Egan

## 2021-10-24 NOTE — PROGRESS NOTES
Patient denies si/hi/avh. Patient denies anxiety and depression. Patient in milieu interacting with peers and attending groups. Patient is smiling and has a pleasant mood. He is eating well. Patient is medication compliant, calm and cooperative at this time. Will continue to monitor.

## 2021-10-24 NOTE — GROUP NOTE
[facility] GROUP DOCUMENTATION INDIVIDUAL                                                                          Group Therapy Note    Date: 10/24/2021    Group Start Time: 1320  Group End Time: 6261  Group Topic: Recreational/Music Therapy    SRM 2  NON ACUTE    [facility]    IP 1150 [facility] GROUP DOCUMENTATION GROUP    Group Therapy Note    Facilitated leisure skills group to reinforce positive coping through music, social interaction, group activities and arts/crafts    Attendees: 10/14         Attendance: Attended    Patient's Goal:  Attend group daily     Interventions/techniques: Art integration and Supported    Follows Directions: Followed directions    Interactions: Interacted appropriately    Mental Status: Calm    Behavior/appearance: Cooperative    Goals Achieved: Able to engage in interactions and Able to listen to others      Additional Notes:  Receptive to listening to music while working on leisure task.  Interacted with peers and staff     Rayray Morning

## 2021-10-24 NOTE — GROUP NOTE
[facility] GROUP DOCUMENTATION INDIVIDUAL                                                                          Group Therapy Note    Date: 10/24/2021    Group Start Time: 0979  Group End Time: 1100  Group Topic: Nursing     [facility] Glad[facility], Porfirio Geiger RN    IP 1150 [facility] GROUP DOCUMENTATION GROUP    Group Therapy Note    Attendees: 5/14         Attendance: Attended     Patient's Goal:  Effective discharge planning    Interventions/techniques: Informed, Validated, Promoted peer support, Provide feedback and Supported    Follows Directions: Followed directions    Interactions: Interacted appropriately    Mental Status: Anxious    Behavior/appearance: Cooperative    Goals Achieved: Able to engage in interactions, Able to listen to others, Able to manage/cope with feelings, Able to experience relief/decrease in symptoms and Able to self-disclose      Additional Notes:  Pt discussed his plans to f/u with outpatient support groups and his hopes of being d/c this week as he has lots of obligations to attend to.  Pt related his frustrations of not being able to take care of his obligations    Christoph Hussein RN

## 2021-10-25 PROCEDURE — 74011250637 HC RX REV CODE- 250/637: Performed by: PSYCHIATRY & NEUROLOGY

## 2021-10-25 PROCEDURE — 65220000003 HC RM SEMIPRIVATE PSYCH

## 2021-10-25 RX ADMIN — THIAMINE HCL TAB 100 MG 100 MG: 100 TAB at 08:35

## 2021-10-25 NOTE — GROUP NOTE
[facility] GROUP DOCUMENTATION INDIVIDUAL                                                                          Group Therapy Note    Date: 10/25/2021    Group Start Time: 1120  Group End Time: 1200  Group Topic: Process Group - Inpatient    SRM CARE MANAGEMENT    Annemarie Hook BSW    [facility] GROUP DOCUMENTATION GROUP      Group Therapy Note      The facilitator checked in with the pts and told them they can come to group to process their time here and bring up any subjects they would like to discuss. The facilitator also encouraged peer feed back and encouragement to one another. Attendees: 11/14          Attendance: Attended    Patient's Goal:  Attend group     Interventions/techniques: Supported    Follows Directions: Followed directions    Interactions: Interacted appropriately    Mental Status: Anxious and Preoccupied    Behavior/appearance: Agitated, Neatly groomed, Needed prompting and Withdrawn/quiet    Goals Achieved: Able to listen to others      Additional Notes: The pt was in an agitated mood and didn't speak during group. He stated that the hospital hasn't been helping him with his needs and that coming here was pointless.        JUAN RAMON Espinal

## 2021-10-25 NOTE — PROGRESS NOTES
36year old pmh etoh abuse, depression,   Visit Vitals  /84 (BP 1 Location: Right upper arm)   Pulse 71   Temp 99.1 °F (37.3 °C)   Resp 19   Ht 5' 9\" (1.753 m)   Wt 72.6 kg (160 lb)   SpO2 100%   BMI 23.63 kg/m²     No current facility-administered medications on file prior to encounter. No current outpatient medications on file prior to encounter.

## 2021-10-25 NOTE — BH NOTES
Nursing Note    Patient is alert and oriented x 4. He denies any SI/HI/AH/VH. Patient denies any anxiety or depression. Broad affect and calm mood. Patient seen watching TV in the dayroom with peers. He voiced no complaints. Staff will continue to monitor patient for safety.

## 2021-10-25 NOTE — BH NOTES
Alert and oriented x4. Affect and mood are congruent. Pt denies SI//HI at this time. No AVH noted or reported. Denies depression and/or anxiety. Medication and meal compliant. Interacting well with peers and staff. Verbalizes desire a dn readiness for planned d/c tomorrow. No complaints voiced. Remains on close observation for safety. Will continue to monitor.

## 2021-10-25 NOTE — BH NOTES
Behavioral Health Treatment Team Note     Patient goal(s) for today: Attend groups  Treatment team focus/goals: Medication management, group therapy    Progress note: Pt was sitting in the day room talking with other patients when writer approached him and introduced himself. Pt was polite and agreeable to completing the interview. Pt was adequately groomed and had good hygiene and was dressed in clean street attire. Pt talked in a moderate tone and medium pace. Pt presented with a full affect and calm mood. Pt denies any SI/HI and AVH. Pt reports getting drunk while on duty, but stated he does not know why he did something so stupid. Pt denies any excessive alcohol use in his life, but stated he saw the bottles and wanted to have a drink. Pt stated that he became belligerent in the ER because he felt they were holding him against his will, and trying to talk to him while he was intoxicated. Pt asked writer why would they try to have a sensible conversation with a person who was intoxicated? Writer told Pt that he could not answer that question. Pt reports he also became agitated at the time because he coached a little league football team and did not want to miss their game by being held at the hospital. A continued level of in patient care needed for medication management and group therapy. LOS:  5  Expected LOS: 5-7    Insurance info/prescription coverage: [facility]  Date of last family contact: 10/24/2021  Family requesting physician contact today:  no  Discharge plan:  Home with services to be coordinated with  upon discharge  Guns in the home:  no   Outpatient provider(s):  Unknown    Participating treatment team members: Danielito Wild, * (assigned SW intern), Betina Kumar.  Sherif Justin

## 2021-10-25 NOTE — GROUP NOTE
IP  GROUP DOCUMENTATION INDIVIDUAL                                                                          Group Therapy Note    Date: 10/25/2021    Group Start Time: 0935  Group End Time: 1859  Group Topic: Education Group - Inpatient    SRM 2  NON ACUTE    Lisa Rodriguezs    IP 1150 [facility] GROUP DOCUMENTATION GROUP    Group Therapy Note    Facilitated discussion focused on learning to explore and communicate feelings    Attendees: 9/14         Attendance: Attended    Patient's Goal:  Attend group daily     Interventions/techniques: Informed and Supported    Follows Directions: Followed directions    Interactions: Interacted appropriately    Mental Status: Calm    Behavior/appearance: Cooperative    Goals Achieved: Able to engage in interactions, Able to listen to others and Able to self-disclose      Additional Notes:  Receptive to information and engaged in discussion. Pt was able to share what make him  feel a certain way.  Pt shared he feel happy when Cleavon Demetris his family and friends\" and feel angry when \"he don't know what will happen and people don't listen to him\"    Bernabe Keto

## 2021-10-25 NOTE — PROGRESS NOTES
Adri Client attended Spirituality Group on 10/25/2021. Rev.  Adebayo Mace, MDiv, [facility], [facility]   paging service: [phone])

## 2021-10-25 NOTE — PROGRESS NOTES
Problem: Depressed Mood (Adult/Pediatric)  Goal: *STG: Verbalizes anger, guilt, and other feelings in a constructive manor  Outcome: Progressing Towards Goal     Problem: Depressed Mood (Adult/Pediatric)  Goal: *STG: Demonstrates reduction in symptoms and increase in insight into coping skills/future focused  Outcome: Progressing Towards Goal     Problem: Depressed Mood (Adult/Pediatric)  Goal: *LTG: Understands illness and can identify signs of relapse  Outcome: Progressing Towards Goal

## 2021-10-25 NOTE — GROUP NOTE
IP  GROUP DOCUMENTATION INDIVIDUAL                                                                          Group Therapy Note    Date: 10/25/2021    Group Start Time: 1320  Group End Time: 1400  Group Topic: Recreational/Music Therapy    SRM 2  NON ACUTE    Que Heaps    IP 1150 [facility] GROUP DOCUMENTATION GROUP    Group Therapy Note    Facilitated leisure skills group to reinforce positive coping through music, social interaction, group activities and arts/crafts    Attendees: 7/12         Attendance: Attended    Patient's Goal:  Attend group daily     Interventions/techniques: Art integration and Supported    Follows Directions: Followed directions    Interactions: Interacted appropriately    Mental Status: Calm    Behavior/appearance: Cooperative    Goals Achieved: Able to engage in interactions and Able to listen to others      Additional Notes:  Receptive to listening to music and a song he selected while interacting with peers and staff.  Declined to work on leisure task    Fazal Benitez [address]

## 2021-10-25 NOTE — PROGRESS NOTES
Problem: Depressed Mood (Adult/Pediatric)  Goal: Interventions  10/24/2021 2256 by Richard Nair RN  Outcome: Progressing Towards Goal  10/24/2021 2254 by Richard Nair RN  Outcome: Progressing Towards Goal

## 2021-10-26 VITALS
HEART RATE: 62 BPM | TEMPERATURE: 98 F | RESPIRATION RATE: 18 BRPM | WEIGHT: 160 LBS | OXYGEN SATURATION: 100 % | SYSTOLIC BLOOD PRESSURE: 135 MMHG | DIASTOLIC BLOOD PRESSURE: 88 MMHG | BODY MASS INDEX: 23.7 KG/M2 | HEIGHT: 69 IN

## 2021-10-26 PROCEDURE — 74011250637 HC RX REV CODE- 250/637: Performed by: PSYCHIATRY & NEUROLOGY

## 2021-10-26 RX ORDER — TOPIRAMATE 25 MG/1
25 TABLET ORAL 2 TIMES DAILY WITH MEALS
Status: DISCONTINUED | OUTPATIENT
Start: 2021-10-26 | End: 2021-10-26 | Stop reason: HOSPADM

## 2021-10-26 RX ORDER — TOPIRAMATE 25 MG/1
25 TABLET ORAL 2 TIMES DAILY WITH MEALS
Qty: 14 TABLET | Refills: 0 | Status: SHIPPED | OUTPATIENT
Start: 2021-10-26

## 2021-10-26 RX ADMIN — THIAMINE HCL TAB 100 MG 100 MG: 100 TAB at 08:43

## 2021-10-26 NOTE — BH NOTES
NURSING DISCHARGE NOTE     Discharged back to [facility]. Affect full. Denied feeling depressed, anxious, and/or suicidal. Written Rx given/explained; said he did not need it. Written f/u info given/ advised to take to [facility], in the morning. Escorted down by MEREDITH Salazar.

## 2021-10-26 NOTE — GROUP NOTE
[facility] GROUP DOCUMENTATION INDIVIDUAL                                                                          Group Therapy Note    Date: 10/26/2021    Group Start Time: 0940  Group End Time: 4083  Group Topic: Education Group - Inpatient    [facility] 2  NON ACUTE    Sherif Pat    IP 1150 [facility] GROUP DOCUMENTATION GROUP    Group Therapy Note    Facilitated discussion focused on defining and recognizing examples of different automatic negative thoughts and how they affect moods and behaviors    Attendees: 8/12         Attendance: Attended    Patient's Goal:  Attend group daily     Interventions/techniques: Informed and Supported    Follows Directions:  Followed directions    Interactions: Interacted appropriately    Mental Status: Calm    Behavior/appearance: Cooperative    Goals Achieved: Able to engage in interactions, Able to listen to others and Able to self-disclose      Additional Notes:  Receptive to information discussed and was able to recognize he has engaged in some of them in the past such as \"all or nothing thinking and should statements\"    Bladimir Camejos

## 2021-10-26 NOTE — GROUP NOTE
DESHAUN  GROUP DOCUMENTATION INDIVIDUAL                                                                          Group Therapy Note    Date: 10/26/2021    Group Start Time: 1115  Group End Time: 1140  Group Topic: Process Group - Inpatient    SRM CARE MANAGEMENT    Annemarie Hook BSW    [facility] GROUP DOCUMENTATION GROUP    Group Therapy Note    The facilitator encouraged the group to process their time at the hospital and promoted feedback and support amongst the peers, as well as time for open discussion. Attendees: 10/11         Attendance: Attended    Patient's Goal:  son from  and take him out for ice cream     Interventions/techniques: Supported    Follows Directions: Followed directions    Interactions: Interacted appropriately    Mental Status: Calm and Happy    Behavior/appearance: Attentive, Cooperative and Neatly groomed    Goals Achieved: Able to engage in interactions, Able to listen to others, Able to give feedback to another, Able to reflect/comment on own behavior, Able to receive feedback and Able to self-disclose      Additional Notes: The pt expressed excitement because he is being discharged today and is excited to  his son from . He stated that the hospital is a good place to calm down and think about his actions but not a place where people should come for therapeutic help. He did state that he has resources through base and that he will continue therapy once he leaves.                 JUAN RAMON Carroll

## 2021-10-26 NOTE — SUICIDE SAFETY PLAN
SAFETY PLAN    A suicide Safety Plan is a document that supports someone when they are having thoughts of suicide. Warning Signs that indicate a suicidal crisis may be developing: What (situations, thoughts, feelings, body sensations, behaviors, etc.) do you experience that lets you know you are beginning to think about suicide? 1. frustration  2. Not having control of emotions   3. stress    Internal Coping Strategies:  What things can I do (relaxation techniques, hobbies, physical activities, etc.) to take my mind off my problems without contacting another person? 1. grill  2.  [employer] team  3. Spend time with dog    People and social settings that provide distraction: Who can I call or where can I go to distract me? 1. Name: Flavio Bautista  Phone: per pt, number is in phone   2. Name: Charity Apgar  Phone: [phone]   3. Place: park            4. Place: coaching team    People whom I can ask for help: Who can I call when I need help - for example, friends, family, clergy, someone else? 1. Name: Flavio [place]                Phone: per pt, number is in phone   2. Name: Tosin  Phone: same as above   3. Name: Zoiecathy Dennis  Phone: [phone]    Professionals or [address] [place], [place] [address]vd I can contact during a crisis: Who can I call for help - for example, my doctor, my psychiatrist, my psychologist, a mental health provider, a suicide hotline? 1. Clinician Name: [facility]   Phone: [phone]      Clinician Pager or Emergency Contact #: [phone]    6. Clinician Name: Shira Watson    Phone: [phone]      Clinician Pager or Emergency Contact #: [phone]    3. Suicide Prevention Lifeline: 1-043-213-TALK (4060)    4.  [address] [place] [address] Emergency Services -  for example, [facility] suicide hotline, [facility] Hotline: [facility]      Emergency Services Address: [place] [address]      Emergency Services Phone: [phone]    Making the environment safe: How can I make my environment (house/apartment/living space) safer? For example, can I remove guns, medications, and other items? 1. Remove alcohol  2.  Follow up with appts

## 2021-10-26 NOTE — BH NOTES
Command Session    Writer facilitated command session with pt and [place]. Pt verbalized understanding of why he was brought to the hospital and that he recognizes his mistakes. Asha Nunez was very supportive and encouraged pt to take advantage of the help. Writer spoke about how our mistakes don't define us but how we react to them does. Asha Garrett verbalized approval of pt returning home today to follow up with Ottoniel on 10/27.

## 2021-10-26 NOTE — BH NOTES
Pt. Encouraged to attend group but did not attend. Leisure skills group.  Pt was preparing to discharge

## 2021-10-26 NOTE — BH NOTES
Nursing Note    Patient is alert and oriented x 4. He denies any SI/HI/AH/VH. Patient denies any anxiety or depression. Broad affect and calm mood. Patient seen watching TV in the dayroom. He voiced no complaints and looks forward to being discharged tomorrow. Staff will continue to monitor patient for safety.

## 2021-10-26 NOTE — GROUP NOTE
IP  GROUP DOCUMENTATION INDIVIDUAL                                                                          Group Therapy Note    Date: 10/26/2021    Group Start Time: 0915  Group End Time: 0940  Group Topic: [employer] Meeting    [facility] 2  NON ACUTE    [address] [facility] GROUP DOCUMENTATION GROUP    Group Therapy Note    Community Meeting/Unit Orientation         Attendance: Attended    Patient's Goal: To go home with family, pets, and  his youth league team.    Interventions/techniques: Promoted peer support    Follows Directions:  Followed directions    Interactions: Interacted appropriately    Mental Status: Calm    Behavior/appearance: Attentive    Goals Achieved: Able to listen to others      Additional Notes:  *Tiff Mack

## 2021-10-26 NOTE — BH NOTES
Behavioral Health Transition Record to Provider    Patient Name: Connor Knutson  YOB: 1990  Medical Record Number: [id number]  Date of Admission: 10/20/2021  Date of Discharge: 10.26.21    Attending Provider: Katie Enciso MD  Discharging Provider: Dr Kristin Butts  To contact this individual call [phone] and ask the  to page. If unavailable, ask to be transferred to [address] [place] [address] Provider on call. [facility] Provider will be available on call 24/7 and during holidays. Primary Care Provider: None    No Known Allergies    Reason for Admission: Pt was admitted for intoxication and concerns about his safety    Admission Diagnosis: ETOH abuse [F10.10]  Delusion ([address] [F22]    * No surgery found *    Results for orders placed or performed during the hospital encounter of 10/20/21   SARS-COV-2   Result Value Ref Range    SARS-CoV-2 Not Detected Not Detected     CBC W/O DIFF   Result Value Ref Range    WBC 8.9 4.1 - 11.1 K/uL    RBC 5.18 4.10 - 5.70 M/uL    HGB 15.2 12.1 - 17.0 g/dL    HCT 44.9 36.6 - 50.3 %    MCV 86.7 80.0 - 99.0 FL    MCH 29.3 26.0 - 34.0 PG    MCHC 33.9 30.0 - 36.5 g/dL    RDW 13.7 11.5 - 14.5 %    PLATELET 672 596 - 010 K/uL    MPV 10.7 8.9 - 12.9 FL    NRBC 0.0 0.0  WBC    ABSOLUTE NRBC 0.00 0.00 - 8.71 K/uL   METABOLIC PANEL, COMPREHENSIVE   Result Value Ref Range    Sodium 143 136 - 145 mmol/L    Potassium 3.9 3.5 - 5.1 mmol/L    Chloride 107 97 - 108 mmol/L    CO2 26 21 - 32 mmol/L    Anion gap 10 5 - 15 mmol/L    Glucose 91 65 - 100 mg/dL    BUN 7 6 - 20 mg/dL    Creatinine 1.02 0.70 - 1.30 mg/dL    BUN/Creatinine ratio 7 (L) 12 - 20      GFR est AA >60 >60 ml/min/1.73m2    GFR est non-AA >60 >60 ml/min/1.73m2    Calcium 8.9 8.5 - 10.1 mg/dL    Bilirubin, total 0.6 0.2 - 1.0 mg/dL    AST (SGOT) 15 15 - 37 U/L    ALT (SGPT) 28 12 - 78 U/L    Alk.  phosphatase 80 45 - 117 U/L    Protein, total 8.2 6.4 - 8.2 g/dL    Albumin 4.5 3.5 - 5.0 g/dL    Globulin 3.7 2.0 - 4.0 g/dL    A-G Ratio 1.2 1.1 - 2.2     ETHYL ALCOHOL   Result Value Ref Range    ALCOHOL(ETHYL),SERUM 296 (H) <10 mg/dL   DRUG SCREEN, URINE   Result Value Ref Range    AMPHETAMINES Negative Negative      BARBITURATES Negative Negative      BENZODIAZEPINES Negative Negative      COCAINE Negative Negative      METHADONE Negative Negative      OPIATES Negative Negative      PCP(PHENCYCLIDINE) Negative Negative      THC (TH-CANNABINOL) Positive (A) Negative      Drug screen comment        This test is a screen for drugs of abuse in a medical setting only (i.e., they are unconfirmed results and as such must not be used for non-medical purposes, e.g.,employment testing, legal testing). Due to its inherent nature, false positive (FP) and false negative (FN) results may be obtained. Therefore, if necessary for medical care, recommend confirmation of positive findings by GC/MS.    URINALYSIS W/MICROSCOPIC   Result Value Ref Range    Color Yellow/Straw      Appearance Clear Clear      Specific gravity <1.005 1.003 - 1.030    pH (UA) 7.0 5.0 - 8.0      Protein Negative Negative mg/dL    Glucose Negative Negative mg/dL    Ketone Negative Negative mg/dL    Bilirubin Negative Negative      Blood Negative Negative      Urobilinogen 0.1 0.1 - 1.0 EU/dL    Nitrites Negative Negative      Leukocyte Esterase Negative Negative      WBC 0-4 0 - 4 /hpf    RBC 0-5 0 - 5 /hpf    Bacteria Negative Negative /hpf   SALICYLATE   Result Value Ref Range    Salicylate level <0.5 (L) 2.8 - 20.0 mg/dL    Reported dose date Dose Dependent      Reported dose: Dose Dependent Units   ACETAMINOPHEN   Result Value Ref Range    Acetaminophen level <10 (L) 10 - 30 ug/mL    Reported dose date Dose Dependent      Reported dose: Dose Dependent Units   ETHYL ALCOHOL   Result Value Ref Range    ALCOHOL(ETHYL),SERUM 188 (H) <10 mg/dL   ETHYL ALCOHOL   Result Value Ref Range    ALCOHOL(ETHYL),SERUM 144 (H) <10 mg/dL       Immunizations administered during this encounter: There is no immunization history on file for this patient. Screening for Metabolic Disorders for Patients on Antipsychotic Medications  (Data obtained from the EMR)    Estimated Body Mass Index  Estimated body mass index is 23.63 kg/m² as calculated from the following:    Height as of this encounter: 5' 9\" (1.753 m). Weight as of this encounter: 72.6 kg (160 lb). Vital Signs/Blood Pressure  Visit Vitals  /88   Pulse 62   Temp 98 °F (36.7 °C)   Resp 18   Ht 5' 9\" (1.753 m)   Wt 72.6 kg (160 lb)   SpO2 100%   BMI 23.63 kg/m²       Blood Glucose/Hemoglobin A1c  Lab Results   Component Value Date/Time    Glucose 91 10/20/2021 02:51 PM       No results found for: HBA1C, [id number]     Lipid Panel  No results found for: CHOL, CHOLX, CHLST, CHOLV, 757376, HDL, HDLP, LDL, LDLC, DLDLP, TGLX, TRIGL, TRIGP, CHHD, CHHDX     Discharge Diagnosis: [address] [place] [address]    Discharge Plan: pt will return home with follow ups with [facility]    Discharge Medication List and Instructions:   Current Discharge Medication List      START taking these medications    Details   topiramate (TOPAMAX) 25 mg tablet Take 1 Tablet by mouth two (2) times daily (with meals). Indications: alcoholism  Qty: 14 Tablet, Refills: 0  Start date: 10/26/2021             Unresulted Labs (24h ago, onward)    None        To obtain results of studies pending at discharge, please contact [phone]    Follow-up Information     Follow up With Specialties Details Why Contact Info    None    None (395) Patient stated that they have no PCP      [address] on 10/27/2021 8:30a for follow up care [address] [place] [address] [facility], [address]   [phone]          Advanced Directive:   Does the patient have an appointed surrogate decision maker? No  Does the patient have a Medical Advance Directive? No  Does the patient have a Psychiatric Advance Directive?  No  If the patient does not have a surrogate or Medical Advance Directive AND Psychiatric Advance Directive, the patient was offered information on these advance directives Patient will complete at a later time    Patient Instructions: Please continue all medications until otherwise directed by physician. Tobacco Cessation Discharge Plan:   Is the patient a smoker and needs referral for smoking cessation? Not applicable  Patient referred to the following for smoking cessation with an appointment? Not applicable     Patient was offered medication to assist with smoking cessation at discharge? Not applicable  Was education for smoking cessation added to the discharge instructions? Not applicable    Alcohol/Substance Abuse Discharge Plan:   Does the patient have a history of substance/alcohol abuse and requires a referral for treatment? Yes  Patient referred to the following for substance/alcohol abuse treatment with an appointment? Yes  Patient was offered medication to assist with alcohol cessation at discharge? Not applicable  Was education for substance/alcohol abuse added to discharge instructions? Not applicable    Patient discharged to Home; provided to the patient/caregiver either in hard copy or electronically.

## 2021-10-26 NOTE — BH NOTES
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   Anticipated discharge tomorrow continued inpatient level of care indicated thank you vital signs are stable no new labs resulted

## 2021-10-26 NOTE — PROGRESS NOTES
Problem: Depressed Mood (Adult/Pediatric)  Goal: *STG: Demonstrates reduction in symptoms and increase in insight into coping skills/future focused  Outcome: Progressing Towards Goal

## 2021-11-15 NOTE — DISCHARGE SUMMARY
[facility] [address] SUMMARY    Name:  Julio Welsh  MR#:  [id number]  :  1990  ACCOUNT #:  [de-identified]  ADMIT DATE:  10/20/2021  DISCHARGE DATE:  10/26/2021    Please make reference to my initial psychiatric H and P. 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 Reportedly, he was working and he went to the store there and had 6 shots of alcohol. He was brought here. He apparently prior has gone through a [facility], a Substance Abuse Rehab Program, and he came to the emergency department and he did not want to come in . He wanted to leave and making statements, the whole [employer] cannot stop him. He was subsequently TDO'd. The patient has basically gone through the substance abuse program, still drinks alcohol two times a month according to him. This particular day, he had 6 shots. Collateral information from his fiancee, she is scared of him. She is pregnant with his baby. They also have an [de-identified] year old child. The patient is in the [employer] for four year contract. He is in to three-and-half years, six more months to go, and the patient is in to mechanics. The patient denied depression, suicidal thoughts and homicidal thoughts. No psychotic symptoms. The patient states he sleeps 6 to 8 hours, eats okay. Insight poor. Judgment is poor. Minimizing his alcohol abuse. COURSE AND TREATMENT DURING THE HOSPITALIZATION:  He was put on close observation and medical consult and detoxification program and substance abuse counseling and he was smoothly and safely detoxified and discharged back to [facility], follow up next day. ALLERGIES TO MEDICATIONS:  NONE. SURGERIES:  No surgeries. LABORATORY DATA:  COVID not detected. CBC unremarkable. CMP unremarkable.   Liver functions are normal.  Alcohol level 296. Drug screen was positive for THC and urinalysis unremarkable. Salicylate 1.7. Acetaminophen 10. Glucose 91. Height 5 feet 9 inches, weight 72.6 kilograms, blood pressure 125/88, pulse 62, respirations 19, SPO2 100 room air. DISCHARGE MEDICATIONS:  Topamax 25 mg twice a day. DISCHARGE DIAGNOSES:  Alcohol intoxication. Alcohol abuse, more likely dependent. Anxiety disorder. FOLLOWUP:  Follow up with [facility]. DISPOSITION:  Discharge as improved. Not suicidal.  Not homicidal.  No psychosis. No weapons.   Go to substance abuse rehab program.        MD ABDELRAHMAN [email or web address]  D:  11/14/2021 21:39  T:  11/15/2021 5:58  JOB #:  [id number]

## 2022-03-18 PROBLEM — F10.10 ETOH ABUSE: Status: ACTIVE | Noted: 2021-10-20

## 2022-03-20 PROBLEM — F22 DELUSION (HCC): Status: ACTIVE | Noted: 2021-10-20

## 2023-05-14 RX ORDER — TOPIRAMATE 25 MG/1
25 TABLET ORAL 2 TIMES DAILY WITH MEALS
COMMUNITY
Start: 2021-10-26